# Patient Record
Sex: MALE | Race: WHITE | NOT HISPANIC OR LATINO | Employment: OTHER | URBAN - METROPOLITAN AREA
[De-identification: names, ages, dates, MRNs, and addresses within clinical notes are randomized per-mention and may not be internally consistent; named-entity substitution may affect disease eponyms.]

---

## 2022-07-21 ENCOUNTER — HOSPITAL ENCOUNTER (INPATIENT)
Facility: MEDICAL CENTER | Age: 67
LOS: 1 days | DRG: 177 | End: 2022-07-23
Attending: EMERGENCY MEDICINE | Admitting: STUDENT IN AN ORGANIZED HEALTH CARE EDUCATION/TRAINING PROGRAM
Payer: COMMERCIAL

## 2022-07-21 ENCOUNTER — APPOINTMENT (OUTPATIENT)
Dept: RADIOLOGY | Facility: MEDICAL CENTER | Age: 67
DRG: 177 | End: 2022-07-21
Attending: EMERGENCY MEDICINE
Payer: COMMERCIAL

## 2022-07-21 DIAGNOSIS — U07.1 COVID-19: ICD-10-CM

## 2022-07-21 DIAGNOSIS — R27.0 ATAXIA: ICD-10-CM

## 2022-07-21 DIAGNOSIS — R29.6 FALLS FREQUENTLY: ICD-10-CM
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
## 2022-07-21 DIAGNOSIS — R53.1 GENERALIZED WEAKNESS: ICD-10-CM

## 2022-07-21 PROBLEM — E11.9 TYPE 2 DIABETES MELLITUS (HCC): Status: ACTIVE | Noted: 2022-07-21

## 2022-07-21 PROBLEM — R79.89 TROPONIN LEVEL ELEVATED: Status: ACTIVE | Noted: 2022-07-21

## 2022-07-21 PROBLEM — E83.51 HYPOCALCEMIA: Status: ACTIVE | Noted: 2022-07-21

## 2022-07-21 LAB
25(OH)D3 SERPL-MCNC: 45 NG/ML (ref 30–100)
ALBUMIN SERPL BCP-MCNC: 3.6 G/DL (ref 3.2–4.9)
ALBUMIN/GLOB SERPL: 1.4 G/DL
ALP SERPL-CCNC: 63 U/L (ref 30–99)
ALT SERPL-CCNC: 12 U/L (ref 2–50)
ANION GAP SERPL CALC-SCNC: 12 MMOL/L (ref 7–16)
APPEARANCE UR: CLEAR
APTT PPP: 33.7 SEC (ref 24.7–36)
AST SERPL-CCNC: 10 U/L (ref 12–45)
B-OH-BUTYR SERPL-MCNC: 0.16 MMOL/L (ref 0.02–0.27)
BASOPHILS # BLD AUTO: 0.4 % (ref 0–1.8)
BASOPHILS # BLD: 0.02 K/UL (ref 0–0.12)
BILIRUB SERPL-MCNC: 0.3 MG/DL (ref 0.1–1.5)
BILIRUB UR QL STRIP.AUTO: NEGATIVE
BUN SERPL-MCNC: 19 MG/DL (ref 8–22)
CA-I SERPL-SCNC: 1.1 MMOL/L (ref 1.1–1.3)
CALCIUM SERPL-MCNC: 7.6 MG/DL (ref 8.5–10.5)
CHLORIDE SERPL-SCNC: 109 MMOL/L (ref 96–112)
CK SERPL-CCNC: 80 U/L (ref 0–154)
CO2 SERPL-SCNC: 17 MMOL/L (ref 20–33)
COLOR UR: YELLOW
CREAT SERPL-MCNC: 1.23 MG/DL (ref 0.5–1.4)
CRP SERPL HS-MCNC: 5.17 MG/DL (ref 0–0.75)
EOSINOPHIL # BLD AUTO: 0.01 K/UL (ref 0–0.51)
EOSINOPHIL NFR BLD: 0.2 % (ref 0–6.9)
ERYTHROCYTE [DISTWIDTH] IN BLOOD BY AUTOMATED COUNT: 45 FL (ref 35.9–50)
FLUAV RNA SPEC QL NAA+PROBE: NEGATIVE
FLUBV RNA SPEC QL NAA+PROBE: NEGATIVE
GFR SERPLBLD CREATININE-BSD FMLA CKD-EPI: 65 ML/MIN/1.73 M 2
GLOBULIN SER CALC-MCNC: 2.5 G/DL (ref 1.9–3.5)
GLUCOSE BLD STRIP.AUTO-MCNC: 155 MG/DL (ref 65–99)
GLUCOSE BLD STRIP.AUTO-MCNC: 79 MG/DL (ref 65–99)
GLUCOSE SERPL-MCNC: 138 MG/DL (ref 65–99)
GLUCOSE UR STRIP.AUTO-MCNC: >=1000 MG/DL
HCT VFR BLD AUTO: 44.7 % (ref 42–52)
HGB BLD-MCNC: 15.5 G/DL (ref 14–18)
IMM GRANULOCYTES # BLD AUTO: 0.02 K/UL (ref 0–0.11)
IMM GRANULOCYTES NFR BLD AUTO: 0.4 % (ref 0–0.9)
INR PPP: 1.1 (ref 0.87–1.13)
KETONES UR STRIP.AUTO-MCNC: ABNORMAL MG/DL
LACTATE SERPL-SCNC: 1.3 MMOL/L (ref 0.5–2)
LEUKOCYTE ESTERASE UR QL STRIP.AUTO: NEGATIVE
LIPASE SERPL-CCNC: 22 U/L (ref 11–82)
LYMPHOCYTES # BLD AUTO: 0.85 K/UL (ref 1–4.8)
LYMPHOCYTES NFR BLD: 15.6 % (ref 22–41)
MAGNESIUM SERPL-MCNC: 1.6 MG/DL (ref 1.5–2.5)
MCH RBC QN AUTO: 31 PG (ref 27–33)
MCHC RBC AUTO-ENTMCNC: 34.7 G/DL (ref 33.7–35.3)
MCV RBC AUTO: 89.4 FL (ref 81.4–97.8)
MICRO URNS: ABNORMAL
MONOCYTES # BLD AUTO: 0.69 K/UL (ref 0–0.85)
MONOCYTES NFR BLD AUTO: 12.7 % (ref 0–13.4)
NEUTROPHILS # BLD AUTO: 3.86 K/UL (ref 1.82–7.42)
NEUTROPHILS NFR BLD: 70.7 % (ref 44–72)
NITRITE UR QL STRIP.AUTO: NEGATIVE
NRBC # BLD AUTO: 0 K/UL
NRBC BLD-RTO: 0 /100 WBC
PH UR STRIP.AUTO: 5 [PH] (ref 5–8)
PLATELET # BLD AUTO: 167 K/UL (ref 164–446)
PMV BLD AUTO: 8.9 FL (ref 9–12.9)
POTASSIUM SERPL-SCNC: 3.6 MMOL/L (ref 3.6–5.5)
PROT SERPL-MCNC: 6.1 G/DL (ref 6–8.2)
PROT UR QL STRIP: NEGATIVE MG/DL
PROTHROMBIN TIME: 14.1 SEC (ref 12–14.6)
PTH-INTACT SERPL-MCNC: 33.5 PG/ML (ref 14–72)
RBC # BLD AUTO: 5 M/UL (ref 4.7–6.1)
RBC UR QL AUTO: NEGATIVE
RSV RNA SPEC QL NAA+PROBE: NEGATIVE
SARS-COV-2 RNA RESP QL NAA+PROBE: DETECTED
SODIUM SERPL-SCNC: 138 MMOL/L (ref 135–145)
SP GR UR STRIP.AUTO: 1.03
SPECIMEN SOURCE: ABNORMAL
T4 FREE SERPL-MCNC: 0.9 NG/DL (ref 0.93–1.7)
TROPONIN T SERPL-MCNC: 23 NG/L (ref 6–19)
TSH SERPL DL<=0.005 MIU/L-ACNC: 1.31 UIU/ML (ref 0.38–5.33)
UROBILINOGEN UR STRIP.AUTO-MCNC: 0.2 MG/DL
VIT B12 SERPL-MCNC: 401 PG/ML (ref 211–911)
WBC # BLD AUTO: 5.5 K/UL (ref 4.8–10.8)

## 2022-07-21 PROCEDURE — 71045 X-RAY EXAM CHEST 1 VIEW: CPT

## 2022-07-21 PROCEDURE — A9270 NON-COVERED ITEM OR SERVICE: HCPCS | Performed by: INTERNAL MEDICINE

## 2022-07-21 PROCEDURE — 82010 KETONE BODYS QUAN: CPT

## 2022-07-21 PROCEDURE — 82962 GLUCOSE BLOOD TEST: CPT

## 2022-07-21 PROCEDURE — 700111 HCHG RX REV CODE 636 W/ 250 OVERRIDE (IP): Performed by: INTERNAL MEDICINE

## 2022-07-21 PROCEDURE — 0241U HCHG SARS-COV-2 COVID-19 NFCT DS RESP RNA 4 TRGT MIC: CPT

## 2022-07-21 PROCEDURE — 36415 COLL VENOUS BLD VENIPUNCTURE: CPT

## 2022-07-21 PROCEDURE — 81003 URINALYSIS AUTO W/O SCOPE: CPT

## 2022-07-21 PROCEDURE — 85610 PROTHROMBIN TIME: CPT

## 2022-07-21 PROCEDURE — 82550 ASSAY OF CK (CPK): CPT

## 2022-07-21 PROCEDURE — 84439 ASSAY OF FREE THYROXINE: CPT

## 2022-07-21 PROCEDURE — 93005 ELECTROCARDIOGRAM TRACING: CPT | Performed by: EMERGENCY MEDICINE

## 2022-07-21 PROCEDURE — 82330 ASSAY OF CALCIUM: CPT

## 2022-07-21 PROCEDURE — G0378 HOSPITAL OBSERVATION PER HR: HCPCS

## 2022-07-21 PROCEDURE — 700117 HCHG RX CONTRAST REV CODE 255: Performed by: EMERGENCY MEDICINE

## 2022-07-21 PROCEDURE — 83690 ASSAY OF LIPASE: CPT

## 2022-07-21 PROCEDURE — 83605 ASSAY OF LACTIC ACID: CPT

## 2022-07-21 PROCEDURE — 84484 ASSAY OF TROPONIN QUANT: CPT

## 2022-07-21 PROCEDURE — 83735 ASSAY OF MAGNESIUM: CPT

## 2022-07-21 PROCEDURE — 70496 CT ANGIOGRAPHY HEAD: CPT

## 2022-07-21 PROCEDURE — 96372 THER/PROPH/DIAG INJ SC/IM: CPT

## 2022-07-21 PROCEDURE — 82306 VITAMIN D 25 HYDROXY: CPT

## 2022-07-21 PROCEDURE — 99285 EMERGENCY DEPT VISIT HI MDM: CPT

## 2022-07-21 PROCEDURE — C9803 HOPD COVID-19 SPEC COLLECT: HCPCS | Performed by: EMERGENCY MEDICINE

## 2022-07-21 PROCEDURE — 86140 C-REACTIVE PROTEIN: CPT

## 2022-07-21 PROCEDURE — 85025 COMPLETE CBC W/AUTO DIFF WBC: CPT

## 2022-07-21 PROCEDURE — 83970 ASSAY OF PARATHORMONE: CPT

## 2022-07-21 PROCEDURE — 84443 ASSAY THYROID STIM HORMONE: CPT

## 2022-07-21 PROCEDURE — 85730 THROMBOPLASTIN TIME PARTIAL: CPT

## 2022-07-21 PROCEDURE — 82607 VITAMIN B-12: CPT

## 2022-07-21 PROCEDURE — 80053 COMPREHEN METABOLIC PANEL: CPT

## 2022-07-21 PROCEDURE — 70498 CT ANGIOGRAPHY NECK: CPT

## 2022-07-21 PROCEDURE — 99220 PR INITIAL OBSERVATION CARE,LEVL III: CPT | Performed by: INTERNAL MEDICINE

## 2022-07-21 PROCEDURE — 700102 HCHG RX REV CODE 250 W/ 637 OVERRIDE(OP): Performed by: INTERNAL MEDICINE

## 2022-07-21 RX ORDER — ONDANSETRON 4 MG/1
4 TABLET, ORALLY DISINTEGRATING ORAL EVERY 4 HOURS PRN
Status: DISCONTINUED | OUTPATIENT
Start: 2022-07-21 | End: 2022-07-23 | Stop reason: HOSPADM

## 2022-07-21 RX ORDER — ACETAMINOPHEN 325 MG/1
650 TABLET ORAL EVERY 6 HOURS PRN
Status: DISCONTINUED | OUTPATIENT
Start: 2022-07-21 | End: 2022-07-23 | Stop reason: HOSPADM

## 2022-07-21 RX ORDER — OXYCODONE HYDROCHLORIDE 5 MG/1
5 TABLET ORAL
Status: DISCONTINUED | OUTPATIENT
Start: 2022-07-21 | End: 2022-07-23 | Stop reason: HOSPADM

## 2022-07-21 RX ORDER — AMOXICILLIN 250 MG
2 CAPSULE ORAL 2 TIMES DAILY
Status: DISCONTINUED | OUTPATIENT
Start: 2022-07-21 | End: 2022-07-23 | Stop reason: HOSPADM

## 2022-07-21 RX ORDER — ENOXAPARIN SODIUM 100 MG/ML
40 INJECTION SUBCUTANEOUS DAILY
Status: DISCONTINUED | OUTPATIENT
Start: 2022-07-21 | End: 2022-07-23 | Stop reason: HOSPADM

## 2022-07-21 RX ORDER — DEXTROSE MONOHYDRATE 25 G/50ML
25 INJECTION, SOLUTION INTRAVENOUS
Status: DISCONTINUED | OUTPATIENT
Start: 2022-07-21 | End: 2022-07-23 | Stop reason: HOSPADM

## 2022-07-21 RX ORDER — VITAMIN B COMPLEX
2000 TABLET ORAL DAILY
Status: DISCONTINUED | OUTPATIENT
Start: 2022-07-22 | End: 2022-07-23 | Stop reason: HOSPADM

## 2022-07-21 RX ORDER — GUAIFENESIN/DEXTROMETHORPHAN 100-10MG/5
10 SYRUP ORAL EVERY 6 HOURS PRN
Status: DISCONTINUED | OUTPATIENT
Start: 2022-07-21 | End: 2022-07-23 | Stop reason: HOSPADM

## 2022-07-21 RX ORDER — VITAMIN B COMPLEX
2000 TABLET ORAL DAILY
COMMUNITY

## 2022-07-21 RX ORDER — ONDANSETRON 2 MG/ML
4 INJECTION INTRAMUSCULAR; INTRAVENOUS EVERY 4 HOURS PRN
Status: DISCONTINUED | OUTPATIENT
Start: 2022-07-21 | End: 2022-07-23 | Stop reason: HOSPADM

## 2022-07-21 RX ORDER — AMLODIPINE BESYLATE 5 MG/1
2.5 TABLET ORAL DAILY
Status: DISCONTINUED | OUTPATIENT
Start: 2022-07-22 | End: 2022-07-23 | Stop reason: HOSPADM

## 2022-07-21 RX ORDER — LISINOPRIL 20 MG/1
40 TABLET ORAL DAILY
Status: DISCONTINUED | OUTPATIENT
Start: 2022-07-22 | End: 2022-07-23 | Stop reason: HOSPADM

## 2022-07-21 RX ORDER — OXYCODONE HYDROCHLORIDE 5 MG/1
2.5 TABLET ORAL
Status: DISCONTINUED | OUTPATIENT
Start: 2022-07-21 | End: 2022-07-23 | Stop reason: HOSPADM

## 2022-07-21 RX ORDER — INSULIN GLARGINE 100 [IU]/ML
10 INJECTION, SOLUTION SUBCUTANEOUS EVERY EVENING
COMMUNITY

## 2022-07-21 RX ORDER — MORPHINE SULFATE 4 MG/ML
2 INJECTION INTRAVENOUS
Status: DISCONTINUED | OUTPATIENT
Start: 2022-07-21 | End: 2022-07-23 | Stop reason: HOSPADM

## 2022-07-21 RX ORDER — ROSUVASTATIN CALCIUM 20 MG/1
40 TABLET, COATED ORAL DAILY
Status: DISCONTINUED | OUTPATIENT
Start: 2022-07-22 | End: 2022-07-23 | Stop reason: HOSPADM

## 2022-07-21 RX ORDER — LABETALOL HYDROCHLORIDE 5 MG/ML
10 INJECTION, SOLUTION INTRAVENOUS EVERY 4 HOURS PRN
Status: DISCONTINUED | OUTPATIENT
Start: 2022-07-21 | End: 2022-07-23 | Stop reason: HOSPADM

## 2022-07-21 RX ORDER — BISACODYL 10 MG
10 SUPPOSITORY, RECTAL RECTAL
Status: DISCONTINUED | OUTPATIENT
Start: 2022-07-21 | End: 2022-07-23 | Stop reason: HOSPADM

## 2022-07-21 RX ORDER — ROSUVASTATIN CALCIUM 40 MG/1
40 TABLET, COATED ORAL DAILY
COMMUNITY

## 2022-07-21 RX ORDER — ASPIRIN 81 MG/1
81 TABLET ORAL DAILY
COMMUNITY

## 2022-07-21 RX ORDER — AMLODIPINE BESYLATE 2.5 MG/1
2.5 TABLET ORAL DAILY
COMMUNITY

## 2022-07-21 RX ORDER — CALCIUM CARBONATE 500(1250)
1000 TABLET ORAL 2 TIMES DAILY WITH MEALS
Status: DISCONTINUED | OUTPATIENT
Start: 2022-07-21 | End: 2022-07-23 | Stop reason: HOSPADM

## 2022-07-21 RX ORDER — POLYETHYLENE GLYCOL 3350 17 G/17G
1 POWDER, FOR SOLUTION ORAL
Status: DISCONTINUED | OUTPATIENT
Start: 2022-07-21 | End: 2022-07-23 | Stop reason: HOSPADM

## 2022-07-21 RX ORDER — LISINOPRIL 40 MG/1
40 TABLET ORAL DAILY
COMMUNITY

## 2022-07-21 RX ORDER — SEMAGLUTIDE 1.34 MG/ML
1 INJECTION, SOLUTION SUBCUTANEOUS
COMMUNITY

## 2022-07-21 RX ADMIN — ENOXAPARIN SODIUM 40 MG: 40 INJECTION SUBCUTANEOUS at 23:31

## 2022-07-21 RX ADMIN — INSULIN GLARGINE-YFGN 10 UNITS: 100 INJECTION, SOLUTION SUBCUTANEOUS at 23:32

## 2022-07-21 RX ADMIN — IOHEXOL 80 ML: 350 INJECTION, SOLUTION INTRAVENOUS at 18:42

## 2022-07-21 RX ADMIN — CALCIUM 1000 MG: 500 TABLET ORAL at 23:31

## 2022-07-21 ASSESSMENT — ENCOUNTER SYMPTOMS
HALLUCINATIONS: 0
WEIGHT LOSS: 0
BACK PAIN: 0
FALLS: 1
HEADACHES: 0
SPUTUM PRODUCTION: 0
NECK PAIN: 0
BRUISES/BLEEDS EASILY: 0
PALPITATIONS: 0
ORTHOPNEA: 0
TREMORS: 0
PHOTOPHOBIA: 0
DOUBLE VISION: 0
SPEECH CHANGE: 0
FLANK PAIN: 0
FEVER: 0
BLURRED VISION: 0
HEMOPTYSIS: 0
NAUSEA: 0
COUGH: 0
VOMITING: 0
FOCAL WEAKNESS: 0
CHILLS: 0
POLYDIPSIA: 0
NERVOUS/ANXIOUS: 0
WEAKNESS: 1
HEARTBURN: 0

## 2022-07-21 ASSESSMENT — LIFESTYLE VARIABLES
EVER FELT BAD OR GUILTY ABOUT YOUR DRINKING: NO
ALCOHOL_USE: NO
TOTAL SCORE: 0
EVER FELT BAD OR GUILTY ABOUT YOUR DRINKING: NO
ON A TYPICAL DAY WHEN YOU DRINK ALCOHOL HOW MANY DRINKS DO YOU HAVE: 0
HAVE YOU EVER FELT YOU SHOULD CUT DOWN ON YOUR DRINKING: NO
HOW MANY TIMES IN THE PAST YEAR HAVE YOU HAD 5 OR MORE DRINKS IN A DAY: 0
EVER HAD A DRINK FIRST THING IN THE MORNING TO STEADY YOUR NERVES TO GET RID OF A HANGOVER: NO
CONSUMPTION TOTAL: INCOMPLETE
TOTAL SCORE: 0
HAVE PEOPLE ANNOYED YOU BY CRITICIZING YOUR DRINKING: NO
SUBSTANCE_ABUSE: 0
HAVE YOU EVER FELT YOU SHOULD CUT DOWN ON YOUR DRINKING: NO
AVERAGE NUMBER OF DAYS PER WEEK YOU HAVE A DRINK CONTAINING ALCOHOL: 0
DOES PATIENT WANT TO STOP DRINKING: NO
CONSUMPTION TOTAL: NEGATIVE
DOES PATIENT WANT TO STOP DRINKING: NO
EVER HAD A DRINK FIRST THING IN THE MORNING TO STEADY YOUR NERVES TO GET RID OF A HANGOVER: NO
HAVE PEOPLE ANNOYED YOU BY CRITICIZING YOUR DRINKING: NO
TOTAL SCORE: 0
TOTAL SCORE: 0
ALCOHOL_USE: NO

## 2022-07-21 ASSESSMENT — PATIENT HEALTH QUESTIONNAIRE - PHQ9
SUM OF ALL RESPONSES TO PHQ9 QUESTIONS 1 AND 2: 0
1. LITTLE INTEREST OR PLEASURE IN DOING THINGS: NOT AT ALL
2. FEELING DOWN, DEPRESSED, IRRITABLE, OR HOPELESS: NOT AT ALL

## 2022-07-21 ASSESSMENT — PAIN DESCRIPTION - PAIN TYPE: TYPE: ACUTE PAIN

## 2022-07-21 ASSESSMENT — FIBROSIS 4 INDEX: FIB4 SCORE: 1.12

## 2022-07-21 NOTE — ED PROVIDER NOTES
"ED Provider Note    Scribed for Gloria Martines M.D. by Valentina Chisholm. 7/21/2022  4:53 PM    Primary care provider: No primary care provider on file.  Means of arrival: EMS  History obtained from: Patient  History limited by: None     CHIEF COMPLAINT  Chief Complaint   Patient presents with   • Weakness     Progressively increased weakness since Tuesday. Pt has had 4-5 falls over the past few days.      HPI  Mio Grimm is a 67 y.o. male with a history of diabetes and hyperlipidemia who presents to the Emergency Department via EMS for right leg weakness, a feeling of being off balance with walking, and multiple falls onset 3 days. The patient has a history of strokes and his right leg has been \"funny\" since then. The patient has symptoms of a wobbly gait per wife. He states that he has fallen 3 times today, but denies any injury and denies hitting head or the loss of consciousness. The patient describes his gait as feeling off balance and like he is on a boat. He notes that even with a cane it is hard for him to walk.  Normally he can walk without a cane and often spends time out in his yard doing yard work without any difficulty.  This is very abnormal for him.  He reports that he is also having trouble knowing where his feet in relation to space/the ground. He notes one episode of vomiting yesterday. He denies symptoms of back pain, numbness or tingling of extremities, headache, neck pain, fever, chills, diarrhea, cough, dizzy, spinning, arm weakness, double vision, loss of peripheral vision, speech disturbance, loss of sensation, facial dropping, back pain, rib pain, hip pain, neck pain or any current pain. The patient states he regularly checks his sugar and notes that it has been at 140. The patient is currently taking baby aspirin, blood pressure medications, and insulin.  Patient is vaccinated and boosted for COVID.    REVIEW OF SYSTEMS  Pertinent positives include right leg weakness, " "vomiting, and wobbly gait.   Pertinent negatives include no back pain, numbness or tingling headache, fever, chills, diarrhea, cough, dizzy, spinning, arm weakness, double vision, loss of peripheral vision, dysarthria, loss of sensation, facial dropping, back pain, rib pain, hip pain, neck pain or any current pain.   See HPI for further details. All other systems are negative.    PAST MEDICAL HISTORY  None noted.     FAMILY HISTORY  None noted.     SOCIAL HISTORY  Social History     Tobacco Use   • Smoking status: Current Every Day Smoker     Packs/day: 1.00     Types: Cigarettes     Start date: 07/1980      Social History     Substance and Sexual Activity   Drug Use None noted.      SURGICAL HISTORY  None noted.     CURRENT MEDICATIONS  No current outpatient medications    ALLERGIES  No Known Allergies    PHYSICAL EXAM  VITAL SIGNS: /76   Pulse 79   Temp 36.2 °C (97.2 °F) (Temporal)   Resp 15   Ht 1.651 m (5' 5\")   Wt 102 kg (225 lb)   SpO2 92%   BMI 37.44 kg/m²      Constitutional: Appears weak; No acute distress; Non-toxic appearance.   HENT: Normocephalic, atraumatic; Bilateral external ears normal; Dry mucus membranes. ; No erythema or exudates in the posterior oropharynx.   Eyes: PERRL, EOMI, Conjunctiva normal. No discharge.   Neck:  Supple, nontender midline; No stridor; No nuchal rigidity.   Lymphatic: No cervical lymphadenopathy noted.   Cardiovascular: Regular rate and rhythm without murmurs, rubs, or gallop.   Thorax & Lungs: Nontender ribs. No respiratory distress, breath sounds clear to auscultation bilaterally without wheezing, rales or rhonchi. Nontender chest wall. No crepitus or subcutaneous air  Abdomen: No signs of trauma to the trunk. Soft, nontender, bowel sounds normal. No obvious masses; No pulsatile masses; no rebound, guarding, or peritoneal signs.   Skin: Good color; warm and dry without rash or petechia.  Back: Nontender cervical, thoracic, or lumbar spine without step-off or " deformity, No CVA tenderness.   Extremities: Few contusions to the knees bilaterally.  Full range of motion to bilateral knees.  No pain in either hip with range of motion of either hip.  Bruising of the right forearm without any bony tenderness. Distal radial, dorsalis pedis, posterior tibial pulses are equal bilaterally; Nontender calves or saphenous, No cyanosis, No clubbing.   Musculoskeletal: Good range of motion in all major joints. No tenderness to palpation or major deformities noted.   Neurologic: Alert & oriented x 4, clear speech.  No drift of upper or lower extremities.  No ataxia with finger-to-nose or heel-to-shin.  Slight decreased strength with dorsiflexor testing on the right which patient says is normal for him.  Sensation is intact to light touch.  Cranial nerves II through XII are intact.  NIH is 0    EKG  12 Lead EKG reviewed by me as seen below.     LABS/RADIOLOGY/PROCEDURES  Results for orders placed or performed during the hospital encounter of 07/21/22   CBC WITH DIFFERENTIAL   Result Value Ref Range    WBC 5.5 4.8 - 10.8 K/uL    RBC 5.00 4.70 - 6.10 M/uL    Hemoglobin 15.5 14.0 - 18.0 g/dL    Hematocrit 44.7 42.0 - 52.0 %    MCV 89.4 81.4 - 97.8 fL    MCH 31.0 27.0 - 33.0 pg    MCHC 34.7 33.7 - 35.3 g/dL    RDW 45.0 35.9 - 50.0 fL    Platelet Count 167 164 - 446 K/uL    MPV 8.9 (L) 9.0 - 12.9 fL    Neutrophils-Polys 70.70 44.00 - 72.00 %    Lymphocytes 15.60 (L) 22.00 - 41.00 %    Monocytes 12.70 0.00 - 13.40 %    Eosinophils 0.20 0.00 - 6.90 %    Basophils 0.40 0.00 - 1.80 %    Immature Granulocytes 0.40 0.00 - 0.90 %    Nucleated RBC 0.00 /100 WBC    Neutrophils (Absolute) 3.86 1.82 - 7.42 K/uL    Lymphs (Absolute) 0.85 (L) 1.00 - 4.80 K/uL    Monos (Absolute) 0.69 0.00 - 0.85 K/uL    Eos (Absolute) 0.01 0.00 - 0.51 K/uL    Baso (Absolute) 0.02 0.00 - 0.12 K/uL    Immature Granulocytes (abs) 0.02 0.00 - 0.11 K/uL    NRBC (Absolute) 0.00 K/uL   COMP METABOLIC PANEL   Result Value Ref Range     Sodium 138 135 - 145 mmol/L    Potassium 3.6 3.6 - 5.5 mmol/L    Chloride 109 96 - 112 mmol/L    Co2 17 (L) 20 - 33 mmol/L    Anion Gap 12.0 7.0 - 16.0    Glucose 138 (H) 65 - 99 mg/dL    Bun 19 8 - 22 mg/dL    Creatinine 1.23 0.50 - 1.40 mg/dL    Calcium 7.6 (L) 8.5 - 10.5 mg/dL    AST(SGOT) 10 (L) 12 - 45 U/L    ALT(SGPT) 12 2 - 50 U/L    Alkaline Phosphatase 63 30 - 99 U/L    Total Bilirubin 0.3 0.1 - 1.5 mg/dL    Albumin 3.6 3.2 - 4.9 g/dL    Total Protein 6.1 6.0 - 8.2 g/dL    Globulin 2.5 1.9 - 3.5 g/dL    A-G Ratio 1.4 g/dL   LIPASE   Result Value Ref Range    Lipase 22 11 - 82 U/L   TROPONIN   Result Value Ref Range    Troponin T 23 (H) 6 - 19 ng/L   URINALYSIS (UA)    Specimen: Urine, Clean Catch   Result Value Ref Range    Color Yellow     Character Clear     Specific Gravity 1.030 <1.035    Ph 5.0 5.0 - 8.0    Glucose >=1000 (A) Negative mg/dL    Ketones Trace (A) Negative mg/dL    Protein Negative Negative mg/dL    Bilirubin Negative Negative    Urobilinogen, Urine 0.2 Negative    Nitrite Negative Negative    Leukocyte Esterase Negative Negative    Occult Blood Negative Negative    Micro Urine Req see below    TSH   Result Value Ref Range    TSH 1.310 0.380 - 5.330 uIU/mL   FREE THYROXINE   Result Value Ref Range    Free T-4 0.90 (L) 0.93 - 1.70 ng/dL   ESTIMATED GFR   Result Value Ref Range    GFR (CKD-EPI) 65 >60 mL/min/1.73 m 2   CoV-2, FLU A/B, and RSV by PCR (2-4 Hours EnevoHEID) : Collect NP swab in VTM    Specimen: Respirate   Result Value Ref Range    Influenza virus A RNA Negative Negative    Influenza virus B, PCR Negative Negative    RSV, PCR Negative Negative    SARS-CoV-2 by PCR DETECTED (AA)     SARS-CoV-2 Source NP Swab    PROTHROMBIN TIME   Result Value Ref Range    PT 14.1 12.0 - 14.6 sec    INR 1.10 0.87 - 1.13   APTT   Result Value Ref Range    APTT 33.7 24.7 - 36.0 sec   MAGNESIUM   Result Value Ref Range    Magnesium 1.6 1.5 - 2.5 mg/dL   CRP QUANTITIVE (NON-CARDIAC)   Result Value  Ref Range    Stat C-Reactive Protein 5.17 (H) 0.00 - 0.75 mg/dL   VITAMIN D,25 HYDROXY (DEFICIENCY)   Result Value Ref Range    25-Hydroxy   Vitamin D 25 45 30 - 100 ng/mL   CREATINE KINASE   Result Value Ref Range    CPK Total 80 0 - 154 U/L   LACTIC ACID   Result Value Ref Range    Lactic Acid 1.3 0.5 - 2.0 mmol/L   PTH WITH IONIZED CALCIUM   Result Value Ref Range    Pth, Intact 33.5 14.0 - 72.0 pg/mL    Ionized Calcium 1.1 1.1 - 1.3 mmol/L   BETA-HYDROXYBUTYRIC ACID   Result Value Ref Range    beta-Hydroxybutyric Acid 0.16 0.02 - 0.27 mmol/L   VITAMIN B12   Result Value Ref Range    Vitamin B12 -True Cobalamin 401 211 - 911 pg/mL   T.PALLIDUM AB EIA   Result Value Ref Range    Syphilis, Treponemal Qual Non-Reactive Non-Reactive   CBC with Differential   Result Value Ref Range    WBC 4.8 4.8 - 10.8 K/uL    RBC 5.01 4.70 - 6.10 M/uL    Hemoglobin 15.1 14.0 - 18.0 g/dL    Hematocrit 44.1 42.0 - 52.0 %    MCV 88.0 81.4 - 97.8 fL    MCH 30.1 27.0 - 33.0 pg    MCHC 34.2 33.7 - 35.3 g/dL    RDW 44.5 35.9 - 50.0 fL    Platelet Count 159 (L) 164 - 446 K/uL    MPV 9.1 9.0 - 12.9 fL    Neutrophils-Polys 56.00 44.00 - 72.00 %    Lymphocytes 29.60 22.00 - 41.00 %    Monocytes 13.20 0.00 - 13.40 %    Eosinophils 0.40 0.00 - 6.90 %    Basophils 0.60 0.00 - 1.80 %    Immature Granulocytes 0.20 0.00 - 0.90 %    Nucleated RBC 0.00 /100 WBC    Neutrophils (Absolute) 2.66 1.82 - 7.42 K/uL    Lymphs (Absolute) 1.41 1.00 - 4.80 K/uL    Monos (Absolute) 0.63 0.00 - 0.85 K/uL    Eos (Absolute) 0.02 0.00 - 0.51 K/uL    Baso (Absolute) 0.03 0.00 - 0.12 K/uL    Immature Granulocytes (abs) 0.01 0.00 - 0.11 K/uL    NRBC (Absolute) 0.00 K/uL   Comp Metabolic Panel (CMP)   Result Value Ref Range    Sodium 135 135 - 145 mmol/L    Potassium 3.9 3.6 - 5.5 mmol/L    Chloride 104 96 - 112 mmol/L    Co2 20 20 - 33 mmol/L    Anion Gap 11.0 7.0 - 16.0    Glucose 159 (H) 65 - 99 mg/dL    Bun 18 8 - 22 mg/dL    Creatinine 1.42 (H) 0.50 - 1.40 mg/dL     Calcium 8.7 8.5 - 10.5 mg/dL    AST(SGOT) 18 12 - 45 U/L    ALT(SGPT) 12 2 - 50 U/L    Alkaline Phosphatase 68 30 - 99 U/L    Total Bilirubin 0.3 0.1 - 1.5 mg/dL    Albumin 3.6 3.2 - 4.9 g/dL    Total Protein 6.2 6.0 - 8.2 g/dL    Globulin 2.6 1.9 - 3.5 g/dL    A-G Ratio 1.4 g/dL   ESTIMATED GFR   Result Value Ref Range    GFR (CKD-EPI) 55 (A) >60 mL/min/1.73 m 2   EKG (NOW)   Result Value Ref Range    Report       [facility] Emergency Dept.    Test Date:  2022  Pt Name:    GAYATRI CALVO           Department: ER  MRN:        [id number]                      Room:       [facility]  Gender:     Male                         Technician: [id number]  :        [id number]                   Requested By:MICHAEL MARTINES  Order #:    [id number]                    Reading MD: Michael Martines    Measurements  Intervals                                Axis  Rate:       68                           P:          47  DC:         136                          QRS:        25  QRSD:       90                           T:          13  QT:         372  QTc:        396    Interpretive Statements  SINUS RHYTHM rate 68  Normal axis  Normal intervals  T waves flat throughout  No ST elevation or depression  EARLY PRECORDIAL R/S TRANSITION  No previous ECG available for comparison  Electronically Signed On 2022 1:29:55 PDT by Michael Martines     POCT glucose device results   Result Value Ref Range    POC Glucose, Blood 79 65 - 99 mg/dL   POCT glucose device results   Result Value Ref Range    POC Glucose, Blood 155 (H) 65 - 99 mg/dL     CT-CTA HEAD WITH & W/O-POST PROCESS   Final Result      1.  No acute intracranial hemorrhage or territorial infarct   2.  Diffuse atrophy with ventriculomegaly.   3.  Chronic LEFT occipital infarct.   4.  No intracranial aneurysm, focal high-grade stenosis or abrupt large vessel cut off.      CT-CTA NECK WITH & W/O-POST PROCESSING   Final Result      1.  Diffuse atherosclerotic plaque      2.  Mild  "bilateral internal carotid artery stenoses which are well under 50%      DX-CHEST-PORTABLE (1 VIEW)   Final Result      Negative single view of the chest.      MR-BRAIN-WITH & W/O    (Results Pending)     COURSE & MEDICAL DECISION MAKING  Pertinent Labs & Imaging studies reviewed. (See chart for details)        4:53 PM - Patient seen and examined at bedside. Ordered for labs and radiology to evaluate his symptoms. I informed the patient of my plan to admit today given the patient's current presentation and diagnostic study results. Patient verbalizes understanding and support with my plan for admission.     7:33 PM - Paged Hospitalist.      8:01 PM - I discussed the patient's case and the above findings with Dr. Torres (Hospitalist) who agrees to consult the patient.       Patient presents to the ER complaining of difficulty walking and feeling off balance with attempts to ambulate over the last 3 days.  Additionally he feels like he \"does not have control over his right leg.\"  Wife says that he has always had some weakness of his right leg secondary to previous strokes.  Patient says that he \"feels like he is on a boat\" when he walks.  He is fallen 3 times.  He has not struck his head.  He denies injuring himself in any way.  No neck pain or back pain.  There is no tenderness along the C-spine, T-spine or L-spine.  He is a couple contusions to his knees but he has full range of motion to bilateral knee joints.  No pain with range of motion of his hips.  He says he does not feel like he hurt his knees or hips when he fell.  His neurologic exam is essentially normal.  He says he feels very weak.  He looks weak and unwell.  He has not had fevers or chills.  No complaints of headache.  He vomited once yesterday but not today.  No abdominal pain or tenderness.  He is not coughing.  No shortness of breath or chest pains.  CT/CTA of the head and neck were performed given patient's complaint of ataxia.  Symptoms began 2 " days ago so he is well outside of any window for tPA.  For this reason perfusion was not done.  CT/CTA is negative for anything acute.  Patient did test positive for COVID today.  I think he needs MRI scan to further evaluate the brain to be sure he did not have another stroke causing his ataxia.  Otherwise, his generalized weakness and difficulty walking and falling could simply be due to generalized weakness and fatigue due to COVID.  He will need physical therapy evaluation to be sure he is safe to go home.  The wife had a hard time getting him up to the floor on her own.  She had to call neighbor and EMS for assistance.  This time I think the patient needs to be hospitalized.  I spoke with Dr. Torres, hospitalist on-call, and he will kindly evaluate the patient hospitalization.    DISPOSITION:  Patient will be hospitalized by Dr. Torres in guarded condition.    FINAL IMPRESSION  1. COVID-19 Acute   2. Ataxia Acute   3. Generalized weakness Acute   4. Fall, initial encounter Acute       Valentina BRAY (Liss), am scribing for, and in the presence of, Gloria Martines M.D..    Electronically signed by: Valentina Chisholm (Scribe), 7/21/2022    IGloria M.D. personally performed the services described in this documentation, as scribed by Valentina Chisholm in my presence, and it is both accurate and complete.    This dictation has been created using voice recognition software. The accuracy of the dictation is limited by the abilities of the software. I expect there may be some errors of grammar and possibly content. I made every attempt to manually correct the errors within my dictation. However, errors related to voice recognition software may still exist and should be interpreted within the appropriate context.    The note accurately reflects work and decisions made by me.  Gloria Martines M.D.  7/22/2022  1:29 AM

## 2022-07-21 NOTE — ED TRIAGE NOTES
"Chief Complaint   Patient presents with   • Weakness     Progressively increased weakness since Tuesday. Pt has had 4-5 falls over the past few days.      Pt BIB [facility] from home with above complaint. History of CVAs in 2007 and 2009 with R sided weakness. Pt lives with sister who was concerned with pt's increased weakness and called EMS. Pt denies hitting head during falls. Pt is A&Ox4.     /65   Pulse 75   Temp 36.2 °C (97.2 °F) (Temporal)   Resp 15   Ht 1.651 m (5' 5\")   Wt 102 kg (225 lb)   SpO2 92%   BMI 37.44 kg/m²     "

## 2022-07-22 ENCOUNTER — APPOINTMENT (OUTPATIENT)
Dept: RADIOLOGY | Facility: MEDICAL CENTER | Age: 67
DRG: 177 | End: 2022-07-22
Attending: STUDENT IN AN ORGANIZED HEALTH CARE EDUCATION/TRAINING PROGRAM
Payer: COMMERCIAL

## 2022-07-22 PROBLEM — I63.9 STROKE OF UNKNOWN ETIOLOGY (HCC): Status: ACTIVE | Noted: 2022-07-22
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
## 2022-07-22 PROBLEM — K52.9 CHRONIC DIARRHEA: Status: ACTIVE | Noted: 2022-07-22

## 2022-07-22 PROBLEM — J18.9 PNEUMONIA: Status: ACTIVE | Noted: 2022-07-21

## 2022-07-22 LAB
ALBUMIN SERPL BCP-MCNC: 3.6 G/DL (ref 3.2–4.9)
ALBUMIN/GLOB SERPL: 1.4 G/DL
ALP SERPL-CCNC: 68 U/L (ref 30–99)
ALT SERPL-CCNC: 12 U/L (ref 2–50)
ANION GAP SERPL CALC-SCNC: 11 MMOL/L (ref 7–16)
AST SERPL-CCNC: 18 U/L (ref 12–45)
BASOPHILS # BLD AUTO: 0.6 % (ref 0–1.8)
BASOPHILS # BLD: 0.03 K/UL (ref 0–0.12)
BILIRUB SERPL-MCNC: 0.3 MG/DL (ref 0.1–1.5)
BUN SERPL-MCNC: 18 MG/DL (ref 8–22)
CALCIUM SERPL-MCNC: 8.7 MG/DL (ref 8.5–10.5)
CHLORIDE SERPL-SCNC: 104 MMOL/L (ref 96–112)
CHOLEST SERPL-MCNC: 91 MG/DL (ref 100–199)
CO2 SERPL-SCNC: 20 MMOL/L (ref 20–33)
CREAT SERPL-MCNC: 1.42 MG/DL (ref 0.5–1.4)
CRP SERPL HS-MCNC: 7.91 MG/DL (ref 0–0.75)
EKG IMPRESSION: NORMAL
EOSINOPHIL # BLD AUTO: 0.02 K/UL (ref 0–0.51)
EOSINOPHIL NFR BLD: 0.4 % (ref 0–6.9)
ERYTHROCYTE [DISTWIDTH] IN BLOOD BY AUTOMATED COUNT: 44.5 FL (ref 35.9–50)
ERYTHROCYTE [SEDIMENTATION RATE] IN BLOOD BY WESTERGREN METHOD: 7 MM/HOUR (ref 0–20)
EST. AVERAGE GLUCOSE BLD GHB EST-MCNC: 137 MG/DL
GFR SERPLBLD CREATININE-BSD FMLA CKD-EPI: 55 ML/MIN/1.73 M 2
GLOBULIN SER CALC-MCNC: 2.6 G/DL (ref 1.9–3.5)
GLUCOSE BLD STRIP.AUTO-MCNC: 102 MG/DL (ref 65–99)
GLUCOSE BLD STRIP.AUTO-MCNC: 176 MG/DL (ref 65–99)
GLUCOSE BLD STRIP.AUTO-MCNC: 185 MG/DL (ref 65–99)
GLUCOSE BLD STRIP.AUTO-MCNC: 239 MG/DL (ref 65–99)
GLUCOSE SERPL-MCNC: 159 MG/DL (ref 65–99)
HBA1C MFR BLD: 6.4 % (ref 4–5.6)
HCT VFR BLD AUTO: 44.1 % (ref 42–52)
HDLC SERPL-MCNC: 38 MG/DL
HGB BLD-MCNC: 15.1 G/DL (ref 14–18)
IMM GRANULOCYTES # BLD AUTO: 0.01 K/UL (ref 0–0.11)
IMM GRANULOCYTES NFR BLD AUTO: 0.2 % (ref 0–0.9)
LDLC SERPL CALC-MCNC: 40 MG/DL
LYMPHOCYTES # BLD AUTO: 1.41 K/UL (ref 1–4.8)
LYMPHOCYTES NFR BLD: 29.6 % (ref 22–41)
MCH RBC QN AUTO: 30.1 PG (ref 27–33)
MCHC RBC AUTO-ENTMCNC: 34.2 G/DL (ref 33.7–35.3)
MCV RBC AUTO: 88 FL (ref 81.4–97.8)
MONOCYTES # BLD AUTO: 0.63 K/UL (ref 0–0.85)
MONOCYTES NFR BLD AUTO: 13.2 % (ref 0–13.4)
NEUTROPHILS # BLD AUTO: 2.66 K/UL (ref 1.82–7.42)
NEUTROPHILS NFR BLD: 56 % (ref 44–72)
NRBC # BLD AUTO: 0 K/UL
NRBC BLD-RTO: 0 /100 WBC
PLATELET # BLD AUTO: 159 K/UL (ref 164–446)
PMV BLD AUTO: 9.1 FL (ref 9–12.9)
POTASSIUM SERPL-SCNC: 3.9 MMOL/L (ref 3.6–5.5)
PROT SERPL-MCNC: 6.2 G/DL (ref 6–8.2)
RBC # BLD AUTO: 5.01 M/UL (ref 4.7–6.1)
SODIUM SERPL-SCNC: 135 MMOL/L (ref 135–145)
T PALLIDUM AB SER QL IA: NORMAL
TRIGL SERPL-MCNC: 63 MG/DL (ref 0–149)
VIT B12 SERPL-MCNC: 568 PG/ML (ref 211–911)
WBC # BLD AUTO: 4.8 K/UL (ref 4.8–10.8)

## 2022-07-22 PROCEDURE — 82607 VITAMIN B-12: CPT

## 2022-07-22 PROCEDURE — 700117 HCHG RX CONTRAST REV CODE 255: Performed by: STUDENT IN AN ORGANIZED HEALTH CARE EDUCATION/TRAINING PROGRAM

## 2022-07-22 PROCEDURE — 85652 RBC SED RATE AUTOMATED: CPT

## 2022-07-22 PROCEDURE — 82962 GLUCOSE BLOOD TEST: CPT | Mod: 91

## 2022-07-22 PROCEDURE — A9270 NON-COVERED ITEM OR SERVICE: HCPCS | Performed by: STUDENT IN AN ORGANIZED HEALTH CARE EDUCATION/TRAINING PROGRAM

## 2022-07-22 PROCEDURE — 85025 COMPLETE CBC W/AUTO DIFF WBC: CPT

## 2022-07-22 PROCEDURE — 97162 PT EVAL MOD COMPLEX 30 MIN: CPT

## 2022-07-22 PROCEDURE — 86140 C-REACTIVE PROTEIN: CPT

## 2022-07-22 PROCEDURE — 99233 SBSQ HOSP IP/OBS HIGH 50: CPT | Performed by: STUDENT IN AN ORGANIZED HEALTH CARE EDUCATION/TRAINING PROGRAM

## 2022-07-22 PROCEDURE — A9270 NON-COVERED ITEM OR SERVICE: HCPCS | Performed by: INTERNAL MEDICINE

## 2022-07-22 PROCEDURE — 86780 TREPONEMA PALLIDUM: CPT

## 2022-07-22 PROCEDURE — 80061 LIPID PANEL: CPT

## 2022-07-22 PROCEDURE — 700102 HCHG RX REV CODE 250 W/ 637 OVERRIDE(OP): Performed by: STUDENT IN AN ORGANIZED HEALTH CARE EDUCATION/TRAINING PROGRAM

## 2022-07-22 PROCEDURE — 96372 THER/PROPH/DIAG INJ SC/IM: CPT

## 2022-07-22 PROCEDURE — 80053 COMPREHEN METABOLIC PANEL: CPT

## 2022-07-22 PROCEDURE — A9576 INJ PROHANCE MULTIPACK: HCPCS | Performed by: STUDENT IN AN ORGANIZED HEALTH CARE EDUCATION/TRAINING PROGRAM

## 2022-07-22 PROCEDURE — 70553 MRI BRAIN STEM W/O & W/DYE: CPT

## 2022-07-22 PROCEDURE — 84425 ASSAY OF VITAMIN B-1: CPT

## 2022-07-22 PROCEDURE — 700102 HCHG RX REV CODE 250 W/ 637 OVERRIDE(OP): Performed by: INTERNAL MEDICINE

## 2022-07-22 PROCEDURE — 83036 HEMOGLOBIN GLYCOSYLATED A1C: CPT

## 2022-07-22 PROCEDURE — 770001 HCHG ROOM/CARE - MED/SURG/GYN PRIV*

## 2022-07-22 RX ORDER — NICOTINE 21 MG/24HR
14 PATCH, TRANSDERMAL 24 HOURS TRANSDERMAL
Status: DISCONTINUED | OUTPATIENT
Start: 2022-07-22 | End: 2022-07-23 | Stop reason: HOSPADM

## 2022-07-22 RX ORDER — DEXAMETHASONE 6 MG/1
6 TABLET ORAL DAILY
Status: DISCONTINUED | OUTPATIENT
Start: 2022-07-22 | End: 2022-07-23 | Stop reason: HOSPADM

## 2022-07-22 RX ADMIN — LISINOPRIL 40 MG: 20 TABLET ORAL at 05:55

## 2022-07-22 RX ADMIN — DEXAMETHASONE 6 MG: 6 TABLET ORAL at 15:46

## 2022-07-22 RX ADMIN — SENNOSIDES AND DOCUSATE SODIUM 2 TABLET: 50; 8.6 TABLET ORAL at 05:55

## 2022-07-22 RX ADMIN — ASPIRIN 81 MG: 81 TABLET, COATED ORAL at 05:55

## 2022-07-22 RX ADMIN — INSULIN HUMAN 1 UNITS: 100 INJECTION, SOLUTION PARENTERAL at 17:22

## 2022-07-22 RX ADMIN — INSULIN HUMAN 2 UNITS: 100 INJECTION, SOLUTION PARENTERAL at 21:41

## 2022-07-22 RX ADMIN — CALCIUM 1000 MG: 500 TABLET ORAL at 17:20

## 2022-07-22 RX ADMIN — Medication 2000 UNITS: at 05:55

## 2022-07-22 RX ADMIN — ROSUVASTATIN CALCIUM 40 MG: 20 TABLET, FILM COATED ORAL at 17:20

## 2022-07-22 RX ADMIN — INSULIN GLARGINE-YFGN 10 UNITS: 100 INJECTION, SOLUTION SUBCUTANEOUS at 17:21

## 2022-07-22 RX ADMIN — GADOTERIDOL 20 ML: 279.3 INJECTION, SOLUTION INTRAVENOUS at 18:48

## 2022-07-22 RX ADMIN — AMLODIPINE BESYLATE 2.5 MG: 5 TABLET ORAL at 05:55

## 2022-07-22 RX ADMIN — CALCIUM 1000 MG: 500 TABLET ORAL at 08:21

## 2022-07-22 RX ADMIN — NICOTINE 14 MG: 14 PATCH TRANSDERMAL at 11:57

## 2022-07-22 ASSESSMENT — COGNITIVE AND FUNCTIONAL STATUS - GENERAL
MOVING TO AND FROM BED TO CHAIR: A LITTLE
WALKING IN HOSPITAL ROOM: A LITTLE
CLIMB 3 TO 5 STEPS WITH RAILING: A LITTLE
SUGGESTED CMS G CODE MODIFIER MOBILITY: CK
MOBILITY SCORE: 18
STANDING UP FROM CHAIR USING ARMS: A LITTLE
MOVING FROM LYING ON BACK TO SITTING ON SIDE OF FLAT BED: A LITTLE
TURNING FROM BACK TO SIDE WHILE IN FLAT BAD: A LITTLE

## 2022-07-22 ASSESSMENT — GAIT ASSESSMENTS
ASSISTIVE DEVICE: SINGLE POINT CANE
DISTANCE (FEET): 55
GAIT LEVEL OF ASSIST: CONTACT GUARD ASSIST
DEVIATION: BRADYKINETIC;ATAXIC

## 2022-07-22 ASSESSMENT — PAIN DESCRIPTION - PAIN TYPE: TYPE: ACUTE PAIN

## 2022-07-22 NOTE — ASSESSMENT & PLAN NOTE
Etiology unclear, this very well may be a recrudescence of old left occipital stroke, clearly demonstrated by head CT in the ED    Will obtain MRI of the brain without contrast to rule out new acute stroke  RPR and B12 pending  B1 levels pending  Fall precaution  PT OT evaluation

## 2022-07-22 NOTE — ED NOTES
CDU notified pt ready for pickup. RN unable to come down at this time but will come down as soon as possible.

## 2022-07-22 NOTE — PROGRESS NOTES
Daily Progress Note:     Date of Service: 7/22/2022  Unit: CDU   attending: Dr. Isaias Sommer M.D., MD   Senior Resident: Dr. KING Marshall MD    Contact:  [phone]    Chief Complaint:   Progressive weakness for 3 days prior to presentation    ID:  Mr. Grimm is a 67-year-old man with PMH r/f CVA in 2017, 2009 with residual right lower extremity weakness, insulin-dependent diabetes mellitus type 2 (A1c 6.4 7/2022) and dyslipidemia, who presented 7/21/2022 with 7-day history of poor balance resulting in falls, found to be COVID-19 positive, admitted for stroke rule out and treatment of acute hypoxic respiratory failure secondary to COVID-19 pneumonia.    Subjective:  This morning, Mr Grimm complains of chronic diarrhea for the last month, 1-2 episodes per day.  He denies intermittent constipation/diarrhea, denies weight loss, blood in stool, denies presence of mucus, purulence, bloating foul smell or hard to flush stool.  He does report that his stool is explosive in nature, Stanton stool type VII.  No other complaints  Of note, his oxygen demands have increased and he requires now 1 L via nasal cannula in order to maintain O2 saturations between 88-92%.    Consultants/Specialty:  None    Review of Systems:    Patient denies fevers, chills, night sweats, weight loss, weight gain, vision changes, double vision, ear pain, runny nose, sore throat, sinus pain, trouble swallowing, chest pain, palpitation, orthopnea, PND, swelling,  wheezing, cough, mucus production, hemoptysis, abdominal pain, nausea, vomiting, constipation, melena, reduction of urinary stream, dysuria, hematuria, muscle pain, joint pain, rash, itching, lymphadenopathy, dizziness, headache, weakness, numbness, polyuria, polydipsia, heat intolerance, cold intolerance, depression, anxiety, suicidal ideation.    Objective Data:   Physical Exam:   Vitals:   Temp:  [36.2 °C (97.2 °F)-37.3 °C (99.1 °F)] 36.6 °C (97.8 °F)  Pulse:  [61-79] 62  Resp:   [15-18] 16  BP: ()/(55-76) 97/56  SpO2:  [87 %-93 %] 91 %    General: Older appearing than stated age man laying in bed in no acute distress  HEENT: NC/AT.  PERRLA, EOMI.  External ear normal.  Nares patent, nonedematous nonerythematous.  Moist mucous membranes.  Multiple teeth missing.  Trachea midline.   Neck: No JVP.  Carotid bruit could not be appreciated.  Nontender, nonnodular thyroid.  No anterior or posterior cervical, occipital, supraclavicular lymphadenopathy  Cardiovascular: PMI<2 cm at fifth costal space at midclavicular line.  No heaves appreciated.  No thrills.  RRR W/O M, R, G.  Pulmonary: Normal work of breathing.  Resonant to percussion.  CTAB, no wheezes, crackles, rhonchi heard in 10 lung fields  Abdomen: Flat, soft, nondistended.  Normoactive bowel sounds.  Nontender to percussion or palpation.  No hepatosplenomegaly noted.  No fluid wave  Musculoskeletal: Normal tone and bulk.  Full ROM.  Skin: Warm and dry.  No rashes, bruises or lacerations noted  Neuro: Alert and oriented X4.  CN II-XII checked and intact.  5 out of 5 strength throughout.  DTR 2+ throughout.  FTN, HTS intact.  Sensation intact . negative Romberg.  Lymphatic: No edema or lymphadenopathy noted.  Psychiatric: Good mood congruent affect.  Thought form linear, content appropriate      Labs:   Recent Labs     07/21/22  1649 07/22/22  0032   WBC 5.5 4.8   RBC 5.00 5.01   HEMOGLOBIN 15.5 15.1   HEMATOCRIT 44.7 44.1   MCV 89.4 88.0   MCH 31.0 30.1   RDW 45.0 44.5   PLATELETCT 167 159*   MPV 8.9* 9.1   NEUTSPOLYS 70.70 56.00   LYMPHOCYTES 15.60* 29.60   MONOCYTES 12.70 13.20   EOSINOPHILS 0.20 0.40   BASOPHILS 0.40 0.60     Recent Labs     07/21/22  1649 07/22/22  0032   SODIUM 138 135   POTASSIUM 3.6 3.9   CHLORIDE 109 104   CO2 17* 20   GLUCOSE 138* 159*   BUN 19 18   CPKTOTAL 80  --        Imaging:   MRI brain pending      Assessment and plan:  Mr. Grimm is a 67-year-old man with PMH r/f CVA in 2017, 2009 with residual  right lower extremity weakness, insulin-dependent diabetes mellitus type 2 (A1c 6.4 7/2022) and dyslipidemia, who presented 7/21/2022 with 7-day history of poor balance resulting in falls, found to be COVID-19 positive, admitted for stroke rule out and treatment of acute hypoxic respiratory failure secondary to COVID-19 pneumonia.  Due to increased oxygen demands in the setting of COVID-19, patient will need to be further observed as well as home oxygen will need to be arranged in order to discharge patient safely.  Work-up for stroke is pending at this time.  I therefore suspect that this patient will need at least 2 midnights in order to return back to his previous baseline and or to be discharged home safely with appropriate follow-up.  * Ataxia  Assessment & Plan  Etiology unclear, this very well may be a recrudescence of old left occipital stroke, clearly demonstrated by head CT in the ED    Will obtain MRI of the brain without contrast to rule out new acute stroke  RPR and B12 pending  B1 levels pending  Fall precaution  PT OT evaluation    Chronic diarrhea- (present on admission)  Assessment & Plan  1 month history of nonbloody, nonpurulent diarrhea.  No evidence of mucus.  Patient denies weight loss.  No evidence of anemia.  Patient denies steatorrhea.  Patient does report some nocturnal diarrhea.  Although there is no evidence of anemia or weight loss, this may be a sign of celiac vs ulcerative colitis versus Crohn's.  Absence of mucous points away from IBS, colorectal cancer or polyps.  -Pending ESR, CRP, LFTs, calprotectin, B12, folate levels  -Stool cultures pending    Type 2 diabetes mellitus (HCC)  Assessment & Plan  We will hold metformin, empagliflozin.  We will continue Lantus and sliding scale insulin.    Troponin level elevated  Assessment & Plan  Chronically elevated  Denies chest pain  EKG is nonconcerning for acute ischemia    Hypocalcemia  Assessment & Plan  Calcium 7.6  Will check ionized  calcium, PTH, vitamin D  Oral calcium supplementation started    Pneumonia due to COVID-19- (present on admission)  Assessment & Plan  Reports shortness of breath, now demanding 1 L via nasal cannula to maintain saturations above 88%, chest x-ray negative  Plan: Monitor with pulse oximetry,  Supplemental O2 to support saturations 88-92%  Incentive spirometry  Droplet, eye, contact protection  Start Decadron 6 mg daily for total of 10 days    Falls frequently- (present on admission)  Assessment & Plan  PT OT evaluation  Work-up for new stroke with MRI   Etiology likely secondary to generalized weakness in the setting of COVID-19 pneumonia.  Will rule out more sinister causes as above

## 2022-07-22 NOTE — ASSESSMENT & PLAN NOTE
PT OT evaluation  Work-up for new stroke with MRI   Etiology likely secondary to generalized weakness in the setting of COVID-19 pneumonia.  Will rule out more sinister causes as above

## 2022-07-22 NOTE — H&P
Hospital Medicine History & Physical Note    Date of Service  7/21/2022    Primary Care Physician  No primary care provider on file.        Code Status  Full Code    Chief Complaint  Chief Complaint   Patient presents with   • Weakness     Progressively increased weakness since Tuesday. Pt has had 4-5 falls over the past few days.        History of Presenting Illness  Mio Grimm is a 67 y.o. 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  It appears that he has a lower extremity functional weakness.  He denies any back pain any vision changes, headache, dizziness, speech changes, sensory loss, fever.  He had 1 episode of vomiting yesterday.  Denies abdominal pain, chest pain bladder  or bowel incontinence.  In ER patient appears to be not having any motor deficit on exam, except mild weakness of hip flexion bilaterally.  CT head with and without contrast showed no acute intracranial hemorrhage or infarction.  There is chronic left occipital infarct.  Diffuse atrophy with ventriculomegaly.  Mild bilateral internal carotid artery stenosis under 50%.  Chest x-ray negative.  COVID-19 came back positive.  Patient denies shortness of breath, cough or fever.  Therefore, patient admitted to rule out new stroke due to ataxia        I discussed the plan of care with patient.    Review of Systems  Review of Systems   Constitutional: Negative for chills, fever and weight loss.   HENT: Negative for ear pain, hearing loss and tinnitus.    Eyes: Negative for blurred vision, double vision and photophobia.   Respiratory: Negative for cough, hemoptysis and sputum production.    Cardiovascular: Negative for chest pain, palpitations and orthopnea.   Gastrointestinal: Negative for heartburn, nausea and vomiting.   Genitourinary: Negative for dysuria,  flank pain, frequency and hematuria.   Musculoskeletal: Positive for falls. Negative for back pain, joint pain and neck pain.   Skin: Negative for itching and rash.   Neurological: Positive for weakness. Negative for tremors, speech change, focal weakness and headaches.   Endo/Heme/Allergies: Negative for environmental allergies and polydipsia. Does not bruise/bleed easily.   Psychiatric/Behavioral: Negative for hallucinations and substance abuse. The patient is not nervous/anxious.        Past Medical History  Dyslipidemia  Hypertension  Type 2 diabetes  CVA in 2007, 2009 with residual right leg weakness    Surgical History   has no past surgical history on file.     Family History     Family history reviewed with patient. There is no family history that is pertinent to the chief complaint.     Social History  Denies smoking cigarettes.  Denies drinking alcohol.  Denies using illicit drugs.    Allergies  No Known Allergies    Medications  None       Physical Exam  Temp:  [36.2 °C (97.2 °F)] 36.2 °C (97.2 °F)  Pulse:  [67-79] 67  Resp:  [15-17] 16  BP: (111-136)/(57-76) 111/67  SpO2:  [91 %-92 %] 91 %  Blood Pressure : 111/67   Temperature: 36.2 °C (97.2 °F)   Pulse: 67   Respiration: 16   Pulse Oximetry: 91 %       Physical Exam  Vitals and nursing note reviewed.   Constitutional:       General: He is not in acute distress.     Appearance: Normal appearance.   HENT:      Head: Normocephalic and atraumatic.      Nose: Nose normal.      Mouth/Throat:      Mouth: Mucous membranes are moist.   Eyes:      Extraocular Movements: Extraocular movements intact.      Pupils: Pupils are equal, round, and reactive to light.   Cardiovascular:      Rate and Rhythm: Normal rate and regular rhythm.   Pulmonary:      Effort: Pulmonary effort is normal.      Breath sounds: Normal breath sounds.   Abdominal:      General: Abdomen is flat. There is no distension.      Tenderness: There is no abdominal tenderness.   Musculoskeletal:          General: No swelling or deformity. Normal range of motion.      Cervical back: Normal range of motion and neck supple.   Skin:     General: Skin is warm and dry.   Neurological:      General: No focal deficit present.      Mental Status: He is alert and oriented to person, place, and time.   Psychiatric:         Mood and Affect: Mood normal.         Behavior: Behavior normal.         Laboratory:  Recent Labs     07/21/22  1649   WBC 5.5   RBC 5.00   HEMOGLOBIN 15.5   HEMATOCRIT 44.7   MCV 89.4   MCH 31.0   MCHC 34.7   RDW 45.0   PLATELETCT 167   MPV 8.9*     Recent Labs     07/21/22  1649   SODIUM 138   POTASSIUM 3.6   CHLORIDE 109   CO2 17*   GLUCOSE 138*   BUN 19   CREATININE 1.23   CALCIUM 7.6*     Recent Labs     07/21/22  1649   ALTSGPT 12   ASTSGOT 10*   ALKPHOSPHAT 63   TBILIRUBIN 0.3   LIPASE 22   GLUCOSE 138*     Recent Labs     07/21/22  1645   APTT 33.7   INR 1.10     No results for input(s): NTPROBNP in the last 72 hours.      Recent Labs     07/21/22  1649   TROPONINT 23*       Imaging:  CT-CTA HEAD WITH & W/O-POST PROCESS   Final Result      1.  No acute intracranial hemorrhage or territorial infarct   2.  Diffuse atrophy with ventriculomegaly.   3.  Chronic LEFT occipital infarct.   4.  No intracranial aneurysm, focal high-grade stenosis or abrupt large vessel cut off.      CT-CTA NECK WITH & W/O-POST PROCESSING   Final Result      1.  Diffuse atherosclerotic plaque      2.  Mild bilateral internal carotid artery stenoses which are well under 50%      DX-CHEST-PORTABLE (1 VIEW)   Final Result      Negative single view of the chest.      MR-BRAIN-W/O    (Results Pending)       X-Ray:  I have personally reviewed the images and compared with prior images.    Assessment/Plan:  Justification for Admission Status  I anticipate this patient is appropriate for observation status at this time because Rule out stroke.    Patient will need a Telemetry bed on NEUROLOGY service .  The need is secondary to  ataxia.    * Ataxia  Assessment & Plan  CT head showed old left occipital stroke  Will obtain MRI of the brain without contrast to rule out new acute stroke  We will check RPR and vitamin B12 level  Fall precaution  PT OT evaluation    Falls frequently- (present on admission)  Assessment & Plan  PT OT evaluation  Work-up for new stroke with MRI     Type 2 diabetes mellitus (HCC)  Assessment & Plan  We will hold metformin, empagliflozin.  We will continue Lantus and sliding scale insulin.    Troponin level elevated  Assessment & Plan  Chronically elevated  Denies chest pain  EKG is nonconcerning for acute ischemia    Hypocalcemia  Assessment & Plan  Calcium 7.6  Will check ionized calcium, PTH, vitamin D  Oral calcium supplementation started    COVID-19 virus infection  Assessment & Plan  Asymptomatic, on room air, chest x-ray negative  Plan: Monitor with pulse oximetry,  Incentive spirometry  Droplet, eye, contact protection      VTE prophylaxis: enoxaparin ppx

## 2022-07-22 NOTE — THERAPY
Physical Therapy   Initial Evaluation     Patient Name: Mio Grimm  Age:  67 y.o., Sex:  male  Medical Record #: [id number]  Today's Date: 7/22/2022     Precautions  Precautions: Fall Risk  Comments: R weakness from prior CVA    Assessment  Mr. Grimm is a 66 y/o male who presents to acute secondary to chronic diarrhea, falls, and ataxia. Found to have COVID, chronically elevated troponin. MRI ordered to rule out CVA. Pt does have a history of prior CVA x 2 with residual R sided weakness.     Pt reports he is feeling much better than the last few days. Does have mild R LE weakness, however this appears to be baseline from his prior CVA. Pt with mild ataxia and balance impairments when ambulating. CGA for safety due to lateral sway, however no overt LOB. Pt reported this is the best he has walked in days. Anticipate with increased out of bed time with nursing staff and 1-2 more acute PT sessions pt will return to his baseline.    Plan    Recommend Physical Therapy 4 times per week until therapy goals are met for the following treatments:  Bed Mobility, Gait Training, Neuro Re-Education / Balance, Therapeutic Activities and Therapeutic Exercises    DC Equipment Recommendations: None  Discharge Recommendations: Recommend outpatient physical therapy services to address higher level deficits            Objective       07/22/22 1038   Prior Living Situation   Prior Services Intermittent Physical Support for ADL Per Family   Housing / Facility 1 Story House   Steps Into Home 1   Equipment Owned Single Point Cane   Lives with - Patient's Self Care Capacity   (lives with sister and her spouse)   Prior Level of Functional Mobility   Bed Mobility Independent   Transfer Status Independent   Ambulation Independent   Distance Ambulation (Feet)   (community ambulator)   Assistive Devices Used Single Point Cane   Cognition    Level of Consciousness Alert   Passive ROM Lower Body   Passive ROM Lower Body WDL   Active ROM  Lower Body    Active ROM Lower Body  WDL   Strength Lower Body   Lower Body Strength  X   Comments R LE 4/5, L LE 5/5   Sensation Lower Body   Lower Extremity Sensation   WDL   Balance Assessment   Sitting Balance (Static) Good   Sitting Balance (Dynamic) Fair +   Standing Balance (Static) Fair   Standing Balance (Dynamic) Fair -   Weight Shift Sitting Good   Weight Shift Standing Fair   Comments SPC   Gait Analysis   Gait Level Of Assist Contact Guard Assist   Assistive Device Single Point Cane   Distance (Feet) 55   # of Times Distance was Traveled 1   Deviation Bradykinetic;Ataxic   Weight Bearing Status no restrictions   Bed Mobility    Supine to Sit   (up in chair)   Sit to Supine   (up in chair)   Functional Mobility   Sit to Stand Contact Guard Assist   How much difficulty does the patient currently have...   Turning over in bed (including adjusting bedclothes, sheets and blankets)? 3   Sitting down on and standing up from a chair with arms (e.g., wheelchair, bedside commode, etc.) 3   Moving from lying on back to sitting on the side of the bed? 3   How much help from another person does the patient currently need...   Moving to and from a bed to a chair (including a wheelchair)? 3   Need to walk in a hospital room? 3   Climbing 3-5 steps with a railing? 3   6 clicks Mobility Score 18   Short Term Goals    Short Term Goal # 1 Pt will perform functional transfers with SPV in 6 visits to increase independence   Short Term Goal # 2 Pt will ambulate 150ft with SPC and SPV in 6 visits to increase independence

## 2022-07-22 NOTE — CARE PLAN
The patient is Stable - Low risk of patient condition declining or worsening    Shift Goals  Clinical Goals: MRI, monitor neuro status, isolation  Patient Goals: Rest  Family Goals: N/A Family Not Present    Progress made toward(s) clinical / shift goals:  Pt still awaiting MRI.      Problem: Knowledge Deficit - Standard  Goal: Patient and family/care givers will demonstrate understanding of plan of care, disease process/condition, diagnostic tests and medications  Outcome: Progressing     Problem: Fall Risk  Goal: Patient will remain free from falls  Outcome: Progressing     Problem: Respiratory  Goal: Patient will achieve/maintain optimum respiratory ventilation and gas exchange  Outcome: Progressing     Problem: Mobility  Goal: Patient's capacity to carry out activities will improve  Outcome: Progressing     Problem: Infection - Standard  Goal: Patient will remain free from infection  Outcome: Progressing       Patient is not progressing towards the following goals:

## 2022-07-22 NOTE — ASSESSMENT & PLAN NOTE
Reports shortness of breath, now demanding 1 L via nasal cannula to maintain saturations above 88%, chest x-ray negative  Plan: Monitor with pulse oximetry,  Supplemental O2 to support saturations 88-92%  Incentive spirometry  Droplet, eye, contact protection  Start Decadron 6 mg daily for total of 10 days

## 2022-07-22 NOTE — ASSESSMENT & PLAN NOTE
1 month history of nonbloody, nonpurulent diarrhea.  No evidence of mucus.  Patient denies weight loss.  No evidence of anemia.  Patient denies steatorrhea.  Patient does report some nocturnal diarrhea.  Although there is no evidence of anemia or weight loss, this may be a sign of celiac vs ulcerative colitis versus Crohn's.  Absence of mucous points away from IBS, colorectal cancer or polyps.  -Pending ESR, CRP, LFTs, calprotectin, B12, folate levels  -Stool cultures pending

## 2022-07-22 NOTE — CARE PLAN
The patient is Stable - Low risk of patient condition declining or worsening    Shift Goals  Clinical Goals: MRI, Q4 Neuros  Patient Goals: Rest  Family Goals: N/A Family Not Present    Progress made toward(s) clinical / shift goals:      Problem: Knowledge Deficit - Standard  Goal: Patient and family/care givers will demonstrate understanding of plan of care, disease process/condition, diagnostic tests and medications  Outcome: Progressing

## 2022-07-22 NOTE — ED NOTES
Pt transported off unit with RN on portable monitor. Pt awake and breathing with even, unlabored respirations at time of transfer.

## 2022-07-23 ENCOUNTER — PHARMACY VISIT (OUTPATIENT)
Dept: PHARMACY | Facility: MEDICAL CENTER | Age: 67
End: 2022-07-23
Payer: COMMERCIAL

## 2022-07-23 VITALS
HEART RATE: 58 BPM | SYSTOLIC BLOOD PRESSURE: 119 MMHG | TEMPERATURE: 97.1 F | BODY MASS INDEX: 36.17 KG/M2 | DIASTOLIC BLOOD PRESSURE: 65 MMHG | RESPIRATION RATE: 16 BRPM | HEIGHT: 66 IN | OXYGEN SATURATION: 93 % | WEIGHT: 225.09 LBS

## 2022-07-23 LAB
ALBUMIN SERPL BCP-MCNC: 3.8 G/DL (ref 3.2–4.9)
ALBUMIN/GLOB SERPL: 1.3 G/DL
ALP SERPL-CCNC: 71 U/L (ref 30–99)
ALT SERPL-CCNC: 19 U/L (ref 2–50)
ANION GAP SERPL CALC-SCNC: 11 MMOL/L (ref 7–16)
AST SERPL-CCNC: 24 U/L (ref 12–45)
BASOPHILS # BLD AUTO: 0.3 % (ref 0–1.8)
BASOPHILS # BLD: 0.01 K/UL (ref 0–0.12)
BILIRUB SERPL-MCNC: 0.2 MG/DL (ref 0.1–1.5)
BUN SERPL-MCNC: 31 MG/DL (ref 8–22)
CALCIUM SERPL-MCNC: 8.9 MG/DL (ref 8.5–10.5)
CHLORIDE SERPL-SCNC: 105 MMOL/L (ref 96–112)
CO2 SERPL-SCNC: 19 MMOL/L (ref 20–33)
CREAT SERPL-MCNC: 1.66 MG/DL (ref 0.5–1.4)
EOSINOPHIL # BLD AUTO: 0.01 K/UL (ref 0–0.51)
EOSINOPHIL NFR BLD: 0.3 % (ref 0–6.9)
ERYTHROCYTE [DISTWIDTH] IN BLOOD BY AUTOMATED COUNT: 45.2 FL (ref 35.9–50)
GFR SERPLBLD CREATININE-BSD FMLA CKD-EPI: 45 ML/MIN/1.73 M 2
GLOBULIN SER CALC-MCNC: 2.9 G/DL (ref 1.9–3.5)
GLUCOSE BLD STRIP.AUTO-MCNC: 225 MG/DL (ref 65–99)
GLUCOSE SERPL-MCNC: 206 MG/DL (ref 65–99)
HCT VFR BLD AUTO: 47.6 % (ref 42–52)
HGB BLD-MCNC: 16.2 G/DL (ref 14–18)
IMM GRANULOCYTES # BLD AUTO: 0.01 K/UL (ref 0–0.11)
IMM GRANULOCYTES NFR BLD AUTO: 0.3 % (ref 0–0.9)
LYMPHOCYTES # BLD AUTO: 0.65 K/UL (ref 1–4.8)
LYMPHOCYTES NFR BLD: 19.8 % (ref 22–41)
MCH RBC QN AUTO: 30.7 PG (ref 27–33)
MCHC RBC AUTO-ENTMCNC: 34 G/DL (ref 33.7–35.3)
MCV RBC AUTO: 90.2 FL (ref 81.4–97.8)
MONOCYTES # BLD AUTO: 0.21 K/UL (ref 0–0.85)
MONOCYTES NFR BLD AUTO: 6.4 % (ref 0–13.4)
NEUTROPHILS # BLD AUTO: 2.39 K/UL (ref 1.82–7.42)
NEUTROPHILS NFR BLD: 72.9 % (ref 44–72)
NRBC # BLD AUTO: 0 K/UL
NRBC BLD-RTO: 0 /100 WBC
PLATELET # BLD AUTO: 165 K/UL (ref 164–446)
PMV BLD AUTO: 9.1 FL (ref 9–12.9)
POTASSIUM SERPL-SCNC: 4.9 MMOL/L (ref 3.6–5.5)
PROT SERPL-MCNC: 6.7 G/DL (ref 6–8.2)
RBC # BLD AUTO: 5.28 M/UL (ref 4.7–6.1)
SODIUM SERPL-SCNC: 135 MMOL/L (ref 135–145)
WBC # BLD AUTO: 3.3 K/UL (ref 4.8–10.8)

## 2022-07-23 PROCEDURE — A9270 NON-COVERED ITEM OR SERVICE: HCPCS | Performed by: STUDENT IN AN ORGANIZED HEALTH CARE EDUCATION/TRAINING PROGRAM

## 2022-07-23 PROCEDURE — 700102 HCHG RX REV CODE 250 W/ 637 OVERRIDE(OP): Performed by: INTERNAL MEDICINE

## 2022-07-23 PROCEDURE — 700102 HCHG RX REV CODE 250 W/ 637 OVERRIDE(OP): Performed by: STUDENT IN AN ORGANIZED HEALTH CARE EDUCATION/TRAINING PROGRAM

## 2022-07-23 PROCEDURE — 80053 COMPREHEN METABOLIC PANEL: CPT

## 2022-07-23 PROCEDURE — 99239 HOSP IP/OBS DSCHRG MGMT >30: CPT | Performed by: STUDENT IN AN ORGANIZED HEALTH CARE EDUCATION/TRAINING PROGRAM

## 2022-07-23 PROCEDURE — A9270 NON-COVERED ITEM OR SERVICE: HCPCS | Performed by: INTERNAL MEDICINE

## 2022-07-23 PROCEDURE — 82962 GLUCOSE BLOOD TEST: CPT

## 2022-07-23 PROCEDURE — 85025 COMPLETE CBC W/AUTO DIFF WBC: CPT

## 2022-07-23 PROCEDURE — RXMED WILLOW AMBULATORY MEDICATION CHARGE: Performed by: STUDENT IN AN ORGANIZED HEALTH CARE EDUCATION/TRAINING PROGRAM

## 2022-07-23 PROCEDURE — 700105 HCHG RX REV CODE 258: Performed by: STUDENT IN AN ORGANIZED HEALTH CARE EDUCATION/TRAINING PROGRAM

## 2022-07-23 RX ORDER — SODIUM CHLORIDE, SODIUM LACTATE, POTASSIUM CHLORIDE, CALCIUM CHLORIDE 600; 310; 30; 20 MG/100ML; MG/100ML; MG/100ML; MG/100ML
INJECTION, SOLUTION INTRAVENOUS CONTINUOUS
Status: DISCONTINUED | OUTPATIENT
Start: 2022-07-23 | End: 2022-07-23 | Stop reason: HOSPADM

## 2022-07-23 RX ORDER — DEXAMETHASONE 6 MG/1
6 TABLET ORAL DAILY
Qty: 8 TABLET | Refills: 0 | Status: SHIPPED | OUTPATIENT
Start: 2022-07-24 | End: 2022-08-01

## 2022-07-23 RX ADMIN — ROSUVASTATIN CALCIUM 40 MG: 20 TABLET, FILM COATED ORAL at 05:10

## 2022-07-23 RX ADMIN — SODIUM CHLORIDE, POTASSIUM CHLORIDE, SODIUM LACTATE AND CALCIUM CHLORIDE: 600; 310; 30; 20 INJECTION, SOLUTION INTRAVENOUS at 09:08

## 2022-07-23 RX ADMIN — INSULIN HUMAN 2 UNITS: 100 INJECTION, SOLUTION PARENTERAL at 05:16

## 2022-07-23 RX ADMIN — Medication 2000 UNITS: at 05:10

## 2022-07-23 RX ADMIN — NICOTINE 14 MG: 14 PATCH TRANSDERMAL at 05:09

## 2022-07-23 RX ADMIN — DEXAMETHASONE 6 MG: 6 TABLET ORAL at 05:10

## 2022-07-23 RX ADMIN — LISINOPRIL 40 MG: 20 TABLET ORAL at 05:10

## 2022-07-23 RX ADMIN — ASPIRIN 81 MG: 81 TABLET, COATED ORAL at 05:10

## 2022-07-23 RX ADMIN — CALCIUM 1000 MG: 500 TABLET ORAL at 09:09

## 2022-07-23 RX ADMIN — AMLODIPINE BESYLATE 2.5 MG: 5 TABLET ORAL at 05:10

## 2022-07-23 NOTE — CARE PLAN
The patient is Stable - Low risk of patient condition declining or worsening    Shift Goals  Clinical Goals: MRI Results, Q4 Neuros  Patient Goals: Rest  Family Goals: N/A Family Not Present    Progress made toward(s) clinical / shift goals:      Problem: Knowledge Deficit - Standard  Goal: Patient and family/care givers will demonstrate understanding of plan of care, disease process/condition, diagnostic tests and medications  Outcome: Progressing     Problem: Fall Risk  Goal: Patient will remain free from falls  Outcome: Progressing     Problem: Respiratory  Goal: Patient will achieve/maintain optimum respiratory ventilation and gas exchange  Outcome: Progressing     Problem: Mobility  Goal: Patient's capacity to carry out activities will improve  Outcome: Progressing     Problem: Infection - Standard  Goal: Patient will remain free from infection  Outcome: Progressing

## 2022-07-23 NOTE — DISCHARGE SUMMARY
Discharge Summary    CHIEF COMPLAINT ON ADMISSION  Chief Complaint   Patient presents with   • Weakness     Progressively increased weakness since Tuesday. Pt has had 4-5 falls over the past few days.        Reason for Admission  ems     Admission Date  7/21/2022    CODE STATUS  Full Code    HPI & HOSPITAL COURSE  This is a 67 y.o. male here with falls. Patient with history of stroke with residual right leg weakness, insulin dependent diabetes, who presents for falls at home. Patient reports multiple falls at home the past week. He attributes this to discoordination of his right leg not doing what he wants it to do. Denies any lightheadedness, dizziness. Patient also found to be hypoxic due to COVID 19 infection. He was started on decadron and ultimately able to be weaned off supplemental oxygen. MRI brain performed to rule out acute stroke. MRI brain shows no evidence of acute stroke, but does show old stroke as well as moderate ventriculomegaly more than expected raising possibility of normal pressure hydrocephalus. Patient evaluated by physical therapy and is actually ambulating quite well. He appears to be at baseline mild right leg weakness with mild ataxia and balance impairments when walking however no overt loss of balance. PT recommend outpatient services. Referral placed. No discharge equipment needed, patient to continue use of his cane. At this time given patient's good functional mobility, will hold off on further testing and treatment for possible NPH. At this time suspect patient's falls have been due to COVID infection affecting generalized weakness vs mild recrudescence of old stroke. Should he continue to have falls after COVID infection has resolved, would then pursue evaluation of possible NPH. Patient is advised to follow up with PCP and outpatient physical therapy.    Therefore, he is discharged in fair and stable condition to home with close outpatient follow-up.    The patient met 2-midnight  criteria for an inpatient stay at the time of discharge.    Discharge Date  7/23/2022    FOLLOW UP ITEMS POST DISCHARGE  Take medications as prescribed.  Follow up with PCP.    DISCHARGE DIAGNOSES  Principal Problem:    Ataxia POA: Unknown  Active Problems:    Falls frequently POA: Yes    Pneumonia due to COVID-19 POA: Yes    Hypocalcemia POA: Unknown    Troponin level elevated POA: Unknown    Type 2 diabetes mellitus (HCC) POA: Unknown    Fall from ground level POA: Yes    Chronic diarrhea POA: Yes    Stroke of unknown etiology (HCC) POA: Yes  Resolved Problems:    * No resolved hospital problems. *      FOLLOW UP  No future appointments.  No follow-up provider specified.    MEDICATIONS ON DISCHARGE     Medication List      START taking these medications      Instructions   dexamethasone 6 MG Tabs  Start taking on: July 24, 2022  Commonly known as: DECADRON   Take 1 Tablet by mouth every day for 8 days.  Dose: 6 mg        CONTINUE taking these medications      Instructions   amLODIPine 2.5 MG Tabs  Commonly known as: NORVASC   Take 2.5 mg by mouth every day.  Dose: 2.5 mg     aspirin 81 MG EC tablet   Take 81 mg by mouth every day.  Dose: 81 mg     Crestor 40 MG tablet  Generic drug: rosuvastatin   Take 40 mg by mouth every day.  Dose: 40 mg     Empagliflozin 25 MG Tabs   Take 25 mg by mouth every morning.  Dose: 25 mg     glucose 4 g chewable tablet   Chew 15 g as needed for Low Blood Sugar.  Dose: 15 g     Lantus 100 UNIT/ML Soln  Generic drug: insulin glargine   Inject 10 Units under the skin every evening.  Dose: 10 Units     lisinopril 40 MG tablet  Commonly known as: PRINIVIL   Take 40 mg by mouth every day.  Dose: 40 mg     metformin 1000 MG tablet  Commonly known as: GLUCOPHAGE   Take 1,000 mg by mouth 2 times a day with meals.  Dose: 1,000 mg     Ozempic (0.25 or 0.5 MG/DOSE) 2 MG/1.5ML Sopn  Generic drug: Semaglutide(0.25 or 0.5MG/DOS)   Inject 1 mg under the skin every Monday.  Dose: 1 mg     vitamin D3  1000 Unit (25 mcg) Tabs  Commonly known as: cholecalciferol   Take 2,000 Units by mouth every day.  Dose: 2,000 Units            Allergies  No Known Allergies    DIET  Orders Placed This Encounter   Procedures   • Diet Order Diet: Consistent CHO (Diabetic)     Standing Status:   Standing     Number of Occurrences:   1     Order Specific Question:   Diet:     Answer:   Consistent CHO (Diabetic) [4]       ACTIVITY  As tolerated.  Weight bearing as tolerated    CONSULTATIONS  none    PROCEDURES  none    LABORATORY  Lab Results   Component Value Date    SODIUM 135 07/23/2022    POTASSIUM 4.9 07/23/2022    CHLORIDE 105 07/23/2022    CO2 19 (L) 07/23/2022    GLUCOSE 206 (H) 07/23/2022    BUN 31 (H) 07/23/2022    CREATININE 1.66 (H) 07/23/2022        Lab Results   Component Value Date    WBC 3.3 (L) 07/23/2022    HEMOGLOBIN 16.2 07/23/2022    HEMATOCRIT 47.6 07/23/2022    PLATELETCT 165 07/23/2022        Total time of the discharge process exceeds 36 minutes.

## 2022-07-23 NOTE — FACE TO FACE
"Face to Face Note  -  Durable Medical Equipment    Isaias Sommer M.D. - NPI: [id number]  I certify that this patient is under my care and that they had a durable medical equipment(DME)face to face encounter by myself that meets the physician DME face-to-face encounter requirements with this patient on:    Date of encounter:   Patient:                    MRN:                       YOB: 2020  Mio Grimm  [id number]  1955     The encounter with the patient was in whole, or in part, for the following medical condition, which is the primary reason for durable medical equipment:  Covid-19 Infection    I certify that, based on my findings, the following durable medical equipment is medically necessary:  Oxygen.    HOME O2 Saturation Measurements:(Values must be present for Home Oxygen orders)  Room air sat at rest: 96  Room air sat with amb: 95  With liters of O2: 1, O2 sat at rest with O2: 98  With Liters of O2: 1, O2 sat with amb with O2 : 97  Is the patient mobile?: Yes    My Clinical findings support the need for the above equipment due to:  Hypoxia    Supporting Symptoms: The patient requires supplemental oxygen, as the following interventions have been tried with limited or no improvement: \"Ambulation with oximetry    If patient feels more short of breath, they can go up to 6 liters per minute and contact healthcare provider.  "

## 2022-07-26 LAB — VIT B1 BLD-MCNC: 80 NMOL/L (ref 70–180)
